# Patient Record
Sex: MALE
[De-identification: names, ages, dates, MRNs, and addresses within clinical notes are randomized per-mention and may not be internally consistent; named-entity substitution may affect disease eponyms.]

---

## 2020-06-12 ENCOUNTER — HOSPITAL ENCOUNTER (EMERGENCY)
Dept: HOSPITAL 75 - ER | Age: 50
Discharge: HOME | End: 2020-06-12
Payer: COMMERCIAL

## 2020-06-12 VITALS — HEIGHT: 67.99 IN | WEIGHT: 180.34 LBS | BODY MASS INDEX: 27.33 KG/M2

## 2020-06-12 VITALS — DIASTOLIC BLOOD PRESSURE: 93 MMHG | SYSTOLIC BLOOD PRESSURE: 159 MMHG
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
## 2020-06-12 DIAGNOSIS — S61.432A: Primary | ICD-10-CM

## 2020-06-12 DIAGNOSIS — Z23: ICD-10-CM

## 2020-06-12 PROCEDURE — 90715 TDAP VACCINE 7 YRS/> IM: CPT

## 2020-06-12 PROCEDURE — 73130 X-RAY EXAM OF HAND: CPT
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
## 2020-06-12 NOTE — DIAGNOSTIC IMAGING REPORT
INDICATION: Nailbed injury 



COMPARISON: None.



FINDINGS: 3 views of the left hand were obtained and show no

fractures, dislocations, or other acute bony abnormalities. Joint

spaces are well maintained throughout. The soft tissues appear

unremarkable. No radiopaque foreign bodies are identified.



IMPRESSION: Unremarkable radiographic exam of the left hand.



Dictated by: 



  Dictated on workstation # [id number]

## 2020-06-12 NOTE — XMS REPORT
Patient Summarization Differential

                             Created on: 2020



AMANDA DUNN

External Reference #: [id number]

: 1970

Sex: Male



Demographics





                          Address                   [address]

[place]  [address]

 

                          Home Phone                [phone]

 

                          Preferred Language        English

 

                          Marital Status            M

 

                          Mandaeism Affiliation     [id number]

 

                          Race                      Unknown

 

                          Ethnic Group               or 





Author





                          Author                    [facility]

 

                          [facility]              [facility]

 

                          Address                   [address]  [address]



 

                          Phone                     [phone]







Care Team Providers





                    Care Team Member Name Role                Phone

 

                    SEBASTIAN HUA      Unavailable         [phone]

 

                    ANGELICA VELASQUEZ DO     Unavailable         Unavailable

 

                    WESTLEY LOMAX Unavailable         Unavailable

 

                    YANNI CORTEZ MD   Unavailable         Unavailable

 

                    NO, LOCAL PHYSICIAN PCP                 Unavailable

 

                          Unavailable               Unavailable

 

                          Unavailable               Unavailable



                                            



Allergies

                      The data below is from unstructured sources            No 
Known AllergiesNo known allergies.No known allergies.                           
                                        



Medications

                      



        



         Medication  Ingredient  Drug    Dose    Dates   Status  Sig     Sig    

 Care



                 Class(es)       (Normalized)    (Original)      Provid



                                         er

 

        



         cephalexin  Cephalexin  Cephalospor   20  Active  no      Cephale

anayeli  no



            500 mg oral   in         20         information  Active 500  name



                     tablet (1           Antibacteri         ORAL Three 



                     source.)            al                  Times A Day 



                                         15 Kirstie 



                                         12th, 2020 



                                         9:06pm 



                                                                              



Problems

                      



      



           Problem   Normalized  Date Last  Normalized  Normalized  Provider  Fa

cility



              Classification  Problem(s)   Recorded     Problem      Problem Sta

tus  



                                         Duration   

 

      



           Abdominal pain  Abdominal  2020 -  Episodic  Active    YANNI COBURN

PER ,  [facility] 

Via



                 (3 sources.)    MD jose daniel              [facility] -



                           [facility]



                                         ([id number])



                                                                              



Procedures

                      



    



              Procedure    Normalized Procedure  Procedure Result  Performer    

Facility



                                         Date    

 

    



              2020   Radiography of hand  no information  no name      [facility]

[facility] ([id number])



                                                                              



Immunizations

                      



    



              Normalized   Immunization Date  Notes        Care Provider  Facili

ty



                                         Immunization    

 

    



              tetanus toxoid,  2020 -  no information  no name      [facility] Vi

a [facility]



                     reduced diphtheria  2020          [facility]

g



                           toxoid, and               ([id number])



                                         acellular pertussis    



                                         vaccine, adsorbed    



                                                                              



Results

                      The data below is from unstructured sources            No 
ResultsNo known relevant diagnostic tests and/or laboratory data.No known 
relevant diagnostic tests and/or laboratory data.                               
                                    



Vital Signs

                      The data below is from unstructured sources            



                    Vital Reading                   Result                   Col

lection Date/Time   

            



                                                                    



Interventions

          No Information                                                        
 



Plan of Treatment

                      



    



              Normalized Care  Care Detail  Care Activity Date  Care Provider  F

acility



                                         Activity    

 

    



              Patient Education  Wound Care (DC)  no information  LOCAL NO     A

scension Via



                                         [facility]



                                         ([id number])

 

    



              Patient referral  no information  no information  LOCAL NO     Asc

ension Via



                                         [facility]



                                         ([id number])



                                                                                
       



Goals

                      



 



                           Patient Goal              Desired Goal

 

 



                           no information            no information



                                                                              



Social History

                      



   



                 Normalized Code  Original Code   Date            Value

 

   



                 Tobacco smoking status  Tobacco smoking status  no information 

 Never smoked 

tobacco



                     NHIS                NHIS                (finding)

 

   



                 no information  no information  2020      Occasionally Us

es

 

   



                 no information  no information  2020      No

 

   



                 no information  no information  2020      Denies

 

   



                 no information  no information  2020      Never a Smoker

 

   



                 Sex Assigned At Birth  Sex Assigned At Birth  no information  M

shekhar



                                                                                
                                               



Functional Status

                      The data below is from unstructured sourcesNo Functional 
Status information available                                                    
               



Mental Status

                      The data below is from unstructured sourcesNo Mental 
Status Information Available                                                    
               



Encounters

                      



    



              Encounter    Normalized Encounter  Encounter Diagnosis  Care Provi

jacquelyn  

Organization



                           Date                      Type   

 

    



              2020   Emergency department  no information  (no phone)   [facility]

[facility] 

[facility]



                     -                   patient visit       Hospital (no phone)



                                         2020   Emergency department  no information  ANGELICA VELASQUEZ DO 

(no  [facility] 

[facility]



                 -               patient visit   phone) [facility]



                     2020          [facility] (no phone)     (no phone)

 

    



              2018   Patient encounter  no information  no name      no or

ganization name

 

    



              2015   Patient encounter  no information  YANNI CORTEZ MD (

no  [facility] 

[facility]



                     procedure           phone)              [facility]

g



                                         (no phone)

 

    



                 Patient encounter  no information  (no phone)      [facility]



                                         (no phone)



                                                                                
                                     



Medical Equipment

                      The data below is from unstructured sourcesNo Medical 
Equipment Information available                                                 
                  



Payers

                      The data below is from unstructured sources            



                          Guarantor                   Amanda Dunn          

      

 

                          Address                   [address] [address]                                  

 

                          Contact Info.                   Home Phone: [phone]                



            



                    Payer                   Policy Id                   Coverage

 Id                 

                    Subscriber's Name                   Subscriber Id           

        Effective 

Date                                    Expiration Date                

 

                [employer]                   [id number]                             

          

Amanda Dunn                   [id number]                                  

                                                         



                                                                    



Evaluation note

                      



  



                     Note Type           Note                Facility

 

  



                     Evaluation          No Assessments Information Available  A

scension



                           note                      Via



                                         [facility]



                                         ([id number])



                                                                              



Advance Directives

                      



                    Advance Directive                   Response                

   Recorded 

Date/Time                

 

                    Advance Directives                   No                   Ju

2020 8:59pm

               

 

                    Resuscitation Status                   Full Code            

       2020 8:59pm                



                                                                    



Chief Complaint and Reason for Visit

                      



                          Chief Complaint                   Laceration          

      

 

                          Reason for Visit                   [id number]    

            



                                                          



Additional Source Comments

          This clinical document has been generated using [employer] 
software that has been certified by the Office of the National Coordinator for 
Health Information Technology (ONC 15.99.04.3023.Diam.31.00.0.372973) and the 
National Committee for  (NCQA, as an eMeasure certified 
technology).            

            

FOR RECORDS PERTAINING TO PATIENTS WHO ARE OR HAVE BEEN ENROLLED IN A CHEMICAL D
EPENDENCY/SUBSTANCE ABUSE PROGRAM, SOME INFORMATION MAY BE OMITTED. This clinica
l summary was aggregated from multiple sources.  Caution should be exercised in 
using it in the provision of clinical care. This summary normalizes information 
from multiple sources, and as a consequence, information in this document may ma
terially change the coding, format and clinical context of patient data. In aylin
tion, data may be omitted in some cases. CLINICAL DECISIONS SHOULD BE BASED ON T
HE PRIMARY CLINICAL RECORDS. [employer] provides no warranty or guara
ntee of the accuracy or completeness of information in this document.The followi
ng information is based on time limited clinical information
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
## 2020-06-12 NOTE — XMS REPORT
[facility]

                             Created on: 2018



Alexandr Dunn

External Reference #: [id number]

: 1970

Sex: Male



Demographics





                          Address                   [address]

[place]  [address]

 

                          Preferred Language        Unknown

 

                          Marital Status            Unknown

 

                          Shinto Affiliation     Unknown

 

                          Race                      Unknown

 

                          Ethnic Group              Unknown





Author





                          Author                    Alexandr HUA

 

                          Organization              [facility]

 

                          Address                   [address] [place]  [address]



 

                          Phone                     [phone]







Care Team Providers





                    Care Team Member Name Role                Phone

 

                    SEBASTIAN HUA     Unavailable         [phone]







PROBLEMS

Unknown Problems



ALLERGIES

No Known Allergies



ENCOUNTERS





                Encounter       Location        Date            Diagnosis

 

                          [facility]  [address] [facility] [id number]

[address] [place] 

[address]                              Influenza-like illness R69







IMMUNIZATIONS

No Known Immunizations



SOCIAL HISTORY

Never Assessed



REASON FOR VISIT

fever, headache, body aches. kbullardrn, wife has influenza



PLAN OF CARE





                          Activity                  Details

 

                                         

 

                          Follow Up                 prn Reason:







VITAL SIGNS





                    Height              66 in               2018

 

                    Weight              172.4 lbs           2018

 

                    Temperature         98.3 degrees Fahrenheit 2018

 

                    Heart Rate          74 bpm              2018

 

                    Respiratory Rate    20                  2018

 

                    BMI                 27.82 kg/m2         2018

 

                    Blood pressure systolic 126 mmHg            2018

 

                    Blood pressure diastolic 70 mmHg             2018







MEDICATIONS

No Known Medications



RESULTS

No Results



PROCEDURES

No Known procedures



INSTRUCTIONS





MEDICATIONS ADMINISTERED

No Known Medications